# Patient Record
Sex: MALE | Race: BLACK OR AFRICAN AMERICAN | NOT HISPANIC OR LATINO | Employment: FULL TIME | URBAN - METROPOLITAN AREA
[De-identification: names, ages, dates, MRNs, and addresses within clinical notes are randomized per-mention and may not be internally consistent; named-entity substitution may affect disease eponyms.]

---

## 2021-05-18 ENCOUNTER — OFFICE VISIT (OUTPATIENT)
Dept: CARDIOLOGY | Facility: CLINIC | Age: 41
End: 2021-05-18
Payer: COMMERCIAL

## 2021-05-18 VITALS
SYSTOLIC BLOOD PRESSURE: 121 MMHG | HEIGHT: 72 IN | WEIGHT: 170.19 LBS | HEART RATE: 61 BPM | BODY MASS INDEX: 23.05 KG/M2 | DIASTOLIC BLOOD PRESSURE: 65 MMHG

## 2021-05-18 DIAGNOSIS — R07.89 CHEST PAIN, ATYPICAL: ICD-10-CM

## 2021-05-18 PROCEDURE — 3008F BODY MASS INDEX DOCD: CPT | Mod: CPTII,S$GLB,, | Performed by: INTERNAL MEDICINE

## 2021-05-18 PROCEDURE — 99999 PR PBB SHADOW E&M-NEW PATIENT-LVL III: ICD-10-PCS | Mod: PBBFAC,,, | Performed by: INTERNAL MEDICINE

## 2021-05-18 PROCEDURE — 99203 PR OFFICE/OUTPT VISIT, NEW, LEVL III, 30-44 MIN: ICD-10-PCS | Mod: S$GLB,,, | Performed by: INTERNAL MEDICINE

## 2021-05-18 PROCEDURE — 3008F PR BODY MASS INDEX (BMI) DOCUMENTED: ICD-10-PCS | Mod: CPTII,S$GLB,, | Performed by: INTERNAL MEDICINE

## 2021-05-18 PROCEDURE — 1125F PR PAIN SEVERITY QUANTIFIED, PAIN PRESENT: ICD-10-PCS | Mod: S$GLB,,, | Performed by: INTERNAL MEDICINE

## 2021-05-18 PROCEDURE — 99203 OFFICE O/P NEW LOW 30 MIN: CPT | Mod: S$GLB,,, | Performed by: INTERNAL MEDICINE

## 2021-05-18 PROCEDURE — 1125F AMNT PAIN NOTED PAIN PRSNT: CPT | Mod: S$GLB,,, | Performed by: INTERNAL MEDICINE

## 2021-05-18 PROCEDURE — 99999 PR PBB SHADOW E&M-NEW PATIENT-LVL III: CPT | Mod: PBBFAC,,, | Performed by: INTERNAL MEDICINE

## 2021-05-18 RX ORDER — LEVOFLOXACIN 500 MG/1
500 TABLET, FILM COATED ORAL DAILY
COMMUNITY
Start: 2021-05-12 | End: 2021-05-22

## 2021-05-18 RX ORDER — ALBUTEROL SULFATE 90 UG/1
AEROSOL, METERED RESPIRATORY (INHALATION)
COMMUNITY
Start: 2021-05-12

## 2021-06-01 ENCOUNTER — TELEPHONE (OUTPATIENT)
Dept: CARDIOLOGY | Facility: CLINIC | Age: 41
End: 2021-06-01

## 2025-02-10 ENCOUNTER — HOSPITAL ENCOUNTER (EMERGENCY)
Facility: HOSPITAL | Age: 45
Discharge: HOME | End: 2025-02-10
Attending: EMERGENCY MEDICINE
Payer: COMMERCIAL

## 2025-02-10 VITALS
WEIGHT: 180 LBS | HEIGHT: 72 IN | SYSTOLIC BLOOD PRESSURE: 163 MMHG | RESPIRATION RATE: 20 BRPM | BODY MASS INDEX: 24.38 KG/M2 | HEART RATE: 73 BPM | OXYGEN SATURATION: 98 % | DIASTOLIC BLOOD PRESSURE: 87 MMHG | TEMPERATURE: 98 F

## 2025-02-10 DIAGNOSIS — S00.431A HEMATOMA OF RIGHT AURICULAR REGION: Primary | ICD-10-CM

## 2025-02-10 PROCEDURE — 99283 EMERGENCY DEPT VISIT LOW MDM: CPT

## 2025-02-10 PROCEDURE — 99284 EMERGENCY DEPT VISIT MOD MDM: CPT | Mod: 25,,, | Performed by: OTOLARYNGOLOGY

## 2025-02-10 PROCEDURE — 69000 DRG XTRNL EAR ABSC/HEM SMPL: CPT | Mod: RT,,, | Performed by: OTOLARYNGOLOGY

## 2025-02-10 PROCEDURE — 69000 DRG XTRNL EAR ABSC/HEM SMPL: CPT | Mod: RT

## 2025-02-10 RX ORDER — CIPROFLOXACIN 750 MG/1
750 TABLET, FILM COATED ORAL 2 TIMES DAILY
Qty: 14 TABLET | Refills: 0 | Status: SHIPPED | OUTPATIENT
Start: 2025-02-10 | End: 2025-02-17

## 2025-02-10 RX ORDER — SULFAMETHOXAZOLE AND TRIMETHOPRIM 800; 160 MG/1; MG/1
1 TABLET ORAL 2 TIMES DAILY
COMMUNITY
Start: 2025-02-06 | End: 2025-02-10 | Stop reason: ALTCHOICE

## 2025-02-10 NOTE — FIRST PROVIDER EVALUATION
Medical screening examination initiated.  I have conducted a focused provider triage encounter, findings are as follows:    Brief history of present illness:  43yo M sent from  for auricular hematoma vs abscess. Had it drained 4 times total. Drained at  and it was pus, subsequently drained again and it was blood, now swollen again. Saw ENT last Thursday and told to see plastic surgery. 4 visits in the last 3 weeks for same. On bactrim.     Vitals:    02/10/25 1701   BP: (!) 163/87   Pulse: 73   Resp: 20   Temp: 98.4 °F (36.9 °C)   TempSrc: Oral   SpO2: 98%   Weight: 81.6 kg (180 lb)   Height: 6' (1.829 m)       Pertinent physical exam:  +small auricular hematoma R ear    Brief workup plan:      Preliminary workup initiated; this workup will be continued and followed by the physician or advanced practice provider that is assigned to the patient when roomed.

## 2025-02-11 ENCOUNTER — TELEPHONE (OUTPATIENT)
Facility: OTHER | Age: 45
End: 2025-02-11
Payer: COMMERCIAL

## 2025-02-11 NOTE — SUBJECTIVE & OBJECTIVE
Medications:  Continuous Infusions:  Scheduled Meds:  PRN Meds:     No current facility-administered medications on file prior to encounter.     Current Outpatient Medications on File Prior to Encounter   Medication Sig    [DISCONTINUED] sulfamethoxazole-trimethoprim 800-160mg (BACTRIM DS) 800-160 mg Tab Take 1 tablet by mouth 2 (two) times daily.    albuterol (PROVENTIL/VENTOLIN HFA) 90 mcg/actuation inhaler Inhale into the lungs as needed.       Review of patient's allergies indicates:  No Known Allergies    History reviewed. No pertinent past medical history.  History reviewed. No pertinent surgical history.  Family History    None       Tobacco Use    Smoking status: Former    Smokeless tobacco: Not on file   Substance and Sexual Activity    Alcohol use: Not on file     Comment: occasionally    Drug use: Not on file    Sexual activity: Not on file     Review of Systems  Objective:     Vital Signs (Most Recent):  Temp: 98.4 °F (36.9 °C) (02/10/25 1701)  Pulse: 73 (02/10/25 1843)  Resp: 20 (02/10/25 1701)  BP: (!) 163/87 (02/10/25 1701)  SpO2: 98 % (02/10/25 1701) Vital Signs (24h Range):  Temp:  [98.4 °F (36.9 °C)] 98.4 °F (36.9 °C)  Pulse:  [73] 73  Resp:  [20] 20  SpO2:  [98 %] 98 %  BP: (163)/(87) 163/87     Weight: 81.6 kg (180 lb)  Body mass index is 24.41 kg/m².         Physical Exam  NAD  Resting in bed comfortably   Right auricular hematoma involving the superior 2/3 helix and antihelix  EOMI   Auricles WNL AU  MMM, oropharynx clear, tongue mobile  Neck soft  Unlabored breathing, no stridor        Procedure: Incision and Drainage of auricular hematoma   Verbal consent was obtained, outlining the risks, benefits, and alternatives. 8cc 1% lidocaine with epinephrine 1:100,000 was injected for an upper auricular block.  A small linear incision was made with a 11 blade with return of blood which was evacuated. A xeroform bolster was placed as a compression dressing. The patient tolerated the procedure  "well.       Significant Labs:  CBC: No results for input(s): "WBC", "RBC", "HGB", "HCT", "PLT", "MCV", "MCH", "MCHC" in the last 168 hours.  CMP: No results for input(s): "GLU", "CALCIUM", "ALBUMIN", "PROT", "NA", "K", "CO2", "CL", "BUN", "CREATININE", "ALKPHOS", "ALT", "AST", "BILITOT" in the last 168 hours.    Significant Diagnostics:  None    "

## 2025-02-11 NOTE — HPI
"43 yo male with no pertinent PMHx presents to ED with right ear swelling x3 weeks. He has had failed I&D/aspiration of auricular hematoma x 3. Initially had the ear drained two weeks ago and had repeated swelling. He then went back to urgent care s/p aspiration, again with return of swelling. He then went to urgent ENT on 02/06/2024 and I&D was performed again. He was also started on Bactrim. Pt denies trauma to his ear. He reports the first time it was drained, it was "clear" liquid. Patient denies prior episodes, no fevers or chills.      "

## 2025-02-11 NOTE — ED TRIAGE NOTES
Pt reports swelling to R upper ear that he has had drained multiple times recently. Pt denies any other complaints at this time.

## 2025-02-11 NOTE — DISCHARGE INSTRUCTIONS
Follow up with ENT for management of auricular hematoma    Keep dressing dry and intact    Discontinue Bactrim.  Ciprofloxacin is an antibiotic prescribed today by ENT for infection prophylaxis. Take as directed     You may take Ibuprofen for pain. You may take 800 mg 3-4x daily with meals for pain     Strict ED precautions given to return immediately for new, worsening, or concerning symptoms

## 2025-02-11 NOTE — ED PROVIDER NOTES
Encounter Date: 2/10/2025       History     Chief Complaint   Patient presents with    Ear Problem     Seen 2 times for swelling to r upper ear, had drained 4 times, seen by ent     44-year-old male with no pertinent PMHx presents to ED with right ear swelling x3 weeks.  He was evaluated 2 weeks ago at urgent care (on 2/1/25)  and I&D was performed with clear drainage.  Swelling returned on 02/04/2025.  He went back to urgent care on 02/04/2025 and area was aspirated.  He was states not much drained at that time.  He was referred to ENT.  He was evaluated at urgent ENT on 02/06/2024 and I&D was performed again.  He reports bloody drainage at that time.  He was also started on Bactrim.  He reports compliance with Bactrim.  Your swelling returned on 02/07/2024.  He went back to urgent care yesterday and had it drained again though swelling shortly returned. He denies fever or pain.    The history is provided by the patient.     Review of patient's allergies indicates:  No Known Allergies  History reviewed. No pertinent past medical history.  History reviewed. No pertinent surgical history.  No family history on file.  Social History     Tobacco Use    Smoking status: Former     Review of Systems   Constitutional:  Negative for fever.   HENT:          + ear swelling       Physical Exam     Initial Vitals [02/10/25 1701]   BP Pulse Resp Temp SpO2   (!) 163/87 73 20 98.4 °F (36.9 °C) 98 %      MAP       --         Physical Exam    Nursing note and vitals reviewed.  Constitutional: He appears well-developed and well-nourished. He is not diaphoretic. No distress.   HENT:   Head: Normocephalic and atraumatic.   Ears:    Nose: Nose normal.   Eyes: Conjunctivae and EOM are normal.   Neck: Neck supple.   Cardiovascular:  Normal rate.           Pulmonary/Chest: No respiratory distress.   Musculoskeletal:      Cervical back: Neck supple.     Neurological: He is alert and oriented to person, place, and time.   Skin: No rash  noted.   Psychiatric: He has a normal mood and affect. Thought content normal.           ED Course   Procedures  Labs Reviewed - No data to display         Imaging Results    None          Medications - No data to display  Medical Decision Making  44-year-old male with no pertinent PMHx presents to ED with right ear swelling x3 weeks.  Nontoxic appearing. Hemodynamically stable. Afebrile. Exam as above. I will initiate workup and reassess.    Ddx:  Auricular hematoma, Auricular cellulitis, Auricular abscess,     ENT consulted and preformed I&D. Recommends course of ciprofloxacin and will follow up patient not been clinic on Friday.  At this time patient is stable for discharge. Strict ED precautions given to return immediately for new, worsening, or concerning symptoms      Risk  Prescription drug management.                                      Clinical Impression:  Final diagnoses:  [S00.431A] Hematoma of right auricular region (Primary)          ED Disposition Condition    Discharge Stable          ED Prescriptions       Medication Sig Dispense Start Date End Date Auth. Provider    ciprofloxacin HCl (CIPRO) 750 MG tablet Take 1 tablet (750 mg total) by mouth 2 (two) times daily. for 7 days 14 tablet 2/10/2025 2/17/2025 Maricruz Dowling PA-C          Follow-up Information       Follow up With Specialties Details Why Contact Info Additional Information    [facility] Emergency Medicine  If symptoms worsen [address] [facility] [id number] [phone]     [facility] Otolaryngology Schedule an appointment as soon as possible for a visit   [address] [facility] [id number] [phone] Ear, Nose & Throat Services - Main Building, 4th Floor Please park in [facility] and use Clinic elevator             Maricruz Dowling PA-C  02/10/25 1956

## 2025-02-11 NOTE — CONSULTS
"[facility] Dept  Otorhinolaryngology-Head & Neck Surgery  Consult Note    Patient Name: Donald Way  MRN: [id number]  Code Status: No Order  Admission Date: 2/10/2025  Hospital Length of Stay: 0 days  Attending Physician: Nuvia Linares MD  Primary Care Provider: Yanira, Primary Doctor    Patient information was obtained from patient and ER records.     Inpatient consult to ENT  Consult performed by: Padmini Brunson MD  Consult ordered by: Maricruz Dowling PA-C        Subjective:     Chief Complaint/Reason for Admission: auricular hematoma    History of Present Illness: 43 yo male with no pertinent PMHx presents to ED with right ear swelling x3 weeks. He has had failed I&D/aspiration of auricular hematoma x 3. Initially had the ear drained two weeks ago and had repeated swelling. He then went back to urgent care s/p aspiration, again with return of swelling. He then went to urgent ENT on 02/06/2024 and I&D was performed again. He was also started on Bactrim. Pt denies trauma to his ear. He reports the first time it was drained, it was "clear" liquid. Patient denies prior episodes, no fevers or chills.        Medications:  Continuous Infusions:  Scheduled Meds:  PRN Meds:     No current facility-administered medications on file prior to encounter.     Current Outpatient Medications on File Prior to Encounter   Medication Sig    [DISCONTINUED] sulfamethoxazole-trimethoprim 800-160mg (BACTRIM DS) 800-160 mg Tab Take 1 tablet by mouth 2 (two) times daily.    albuterol (PROVENTIL/VENTOLIN HFA) 90 mcg/actuation inhaler Inhale into the lungs as needed.       Review of patient's allergies indicates:  No Known Allergies    History reviewed. No pertinent past medical history.  History reviewed. No pertinent surgical history.  Family History    None       Tobacco Use    Smoking status: Former    Smokeless tobacco: Not on file   Substance and Sexual Activity    Alcohol use: Not on file     Comment: occasionally    " "Drug use: Not on file    Sexual activity: Not on file     Review of Systems  Objective:     Vital Signs (Most Recent):  Temp: 98.4 °F (36.9 °C) (02/10/25 1701)  Pulse: 73 (02/10/25 1843)  Resp: 20 (02/10/25 1701)  BP: (!) 163/87 (02/10/25 1701)  SpO2: 98 % (02/10/25 1701) Vital Signs (24h Range):  Temp:  [98.4 °F (36.9 °C)] 98.4 °F (36.9 °C)  Pulse:  [73] 73  Resp:  [20] 20  SpO2:  [98 %] 98 %  BP: (163)/(87) 163/87     Weight: 81.6 kg (180 lb)  Body mass index is 24.41 kg/m².         Physical Exam  NAD  Resting in bed comfortably   Right auricular hematoma involving the superior 2/3 helix and antihelix  EOMI   Auricles WNL AU  MMM, oropharynx clear, tongue mobile  Neck soft  Unlabored breathing, no stridor        Procedure: Incision and Drainage of auricular hematoma   Verbal consent was obtained, outlining the risks, benefits, and alternatives. 8cc 1% lidocaine with epinephrine 1:100,000 was injected for an upper auricular block.  A small linear incision was made with a 11 blade with return of blood which was evacuated. A xeroform bolster was placed as a compression dressing. The patient tolerated the procedure well.       Significant Labs:  CBC: No results for input(s): "WBC", "RBC", "HGB", "HCT", "PLT", "MCV", "MCH", "MCHC" in the last 168 hours.  CMP: No results for input(s): "GLU", "CALCIUM", "ALBUMIN", "PROT", "NA", "K", "CO2", "CL", "BUN", "CREATININE", "ALKPHOS", "ALT", "AST", "BILITOT" in the last 168 hours.    Significant Diagnostics:  None    Assessment/Plan:     Hematoma of right auricular region  45 yo male with recurrent auricular  hematoma s/p aspiration and I&D by OSH x 3. He has not previously had a bolster or pressure dressing placed. He is on Bactrim. An I&D was completed in the ED and a xeroform bolster was placed.     - ENT will arrange for follow up on Thurs/Friday this week for removal of bolster  - Recommend ciprofloxacin or antibiotic that w cartilage penetration  - Dispo per ED      VTE " Risk Mitigation (From admission, onward)      None            Thank you for your consult. I will sign off. Please contact us if you have any additional questions.    Padmini Brunson MD  Otorhinolaryngology-Head & Neck Surgery  [facility]

## 2025-02-12 NOTE — PROGRESS NOTES
Patient seen in the ED on 2/10/25 for hematoma to the ear. Called patient to see if he has any concerns or follow up needs related to ED visit. Patient states that his appointment for ENT has already been scheduled for 2/14/2025 at 3:30 PM with Kb Bustillos MD at [facility] [place] [address]. Patient confirmed that he has the address for the appointment and that he will be attending. Patient states that he has no additional concerns or needs at this time. Encounter closed.

## 2025-02-14 ENCOUNTER — OFFICE VISIT (OUTPATIENT)
Dept: OTOLARYNGOLOGY | Facility: CLINIC | Age: 45
End: 2025-02-14
Payer: COMMERCIAL

## 2025-02-14 DIAGNOSIS — S00.431A HEMATOMA OF RIGHT AURICULAR REGION: Primary | ICD-10-CM

## 2025-02-14 PROCEDURE — 99999 PR PBB SHADOW E&M-EST. PATIENT-LVL II: CPT | Mod: PBBFAC,,, | Performed by: OTOLARYNGOLOGY
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
## 2025-02-21 ENCOUNTER — OFFICE VISIT (OUTPATIENT)
Dept: OTOLARYNGOLOGY | Facility: CLINIC | Age: 45
End: 2025-02-21
Payer: COMMERCIAL

## 2025-02-21 DIAGNOSIS — S00.431A HEMATOMA OF RIGHT AURICULAR REGION: Primary | ICD-10-CM

## 2025-02-21 NOTE — PROGRESS NOTES
Donald returns for follow up.  He has been doing well since last visit.  He has not had encountered any significant pain.  No additional swelling is noted in the ear.    Bolsters were removed from the bright scapha.  Minimal edema is present in the skin between where the 2 bolsters were placed.  No obvious fluid re-accumulation is noted.    Auricular hematoma/seroma status post incision and drainage.  He is doing well.  He is encouraged to limit any frictional trauma to the ear over the next few weeks.  Follow up on an as-needed basis if he experiences reaccumulation of fluid